# Patient Record
Sex: FEMALE | Race: BLACK OR AFRICAN AMERICAN
[De-identification: names, ages, dates, MRNs, and addresses within clinical notes are randomized per-mention and may not be internally consistent; named-entity substitution may affect disease eponyms.]

---

## 2017-02-12 ENCOUNTER — HOSPITAL ENCOUNTER (EMERGENCY)
Dept: HOSPITAL 10 - E/R | Age: 37
Discharge: HOME | End: 2017-02-12
Payer: COMMERCIAL

## 2017-02-12 VITALS — BODY MASS INDEX: 66.33 KG/M2 | HEIGHT: 55 IN | WEIGHT: 286.6 LBS

## 2017-02-12 DIAGNOSIS — R51: Primary | ICD-10-CM

## 2017-02-12 DIAGNOSIS — R40.2142: ICD-10-CM

## 2017-02-12 DIAGNOSIS — R40.2362: ICD-10-CM

## 2017-02-12 DIAGNOSIS — F44.9: ICD-10-CM

## 2017-02-12 DIAGNOSIS — F17.210: ICD-10-CM

## 2017-02-12 DIAGNOSIS — R40.2132: ICD-10-CM

## 2017-02-12 PROCEDURE — 96374 THER/PROPH/DIAG INJ IV PUSH: CPT

## 2017-02-12 NOTE — ERD
ER Documentation


Chief Complaint


Date/Time


DATE: 2/12/17 


TIME: 14:40


Chief Complaint


HEADACHE X 1 WEEK; CRANIAL SHUNT IN HEAD





HPI


This is a 36-year-old woman presenting with headache and "seizures".  She has 

chronic recurrent pains including recurrent headaches and has intermittent full 

body tremors and is requesting opioids and benzodiazepine medications to help 

with her symptoms.  She has a history of  shunt although multiple previous CT 

scans of the brain have been unremarkable.  She has had no fevers or chills, no 

vomiting or diarrhea, no loss of consciousness, no chest pain or shortness of 

breath.





ROS


All systems reviewed and are negative except as per history of present illness.





Medications


Home Meds


Active Scripts


Ibuprofen* (Ibuprofen*) 600 Mg Tablet, 600 MG PO Q8 for PAIN, #30 TAB


   Prov:MADELINE WOLFE MD         2/12/17


Alprazolam* (Xanax*) 0.5 Mg Tab, 0.5 MG PO TID, #20 TAB


   Prov:WINSTON LE DO         10/23/16


Benzocaine* (Orajel Maximum*) 1 Applic Gel, 1 APPLIC BUCCAL Q6, #1 TUB


   Prov:KHARI ARTEAGA. NP         9/16/16


Amoxicillin* (Amoxicillin*) 500 Mg Cap, 500 MG PO TID for 7 Days, CAP


   Prov:KHARI ARTEAGA. NP         9/16/16


Ibuprofen* (Motrin*) 600 Mg Tab, 600 MG PO Q6H Y for PAIN AND OR ELEVATED TEMP, 

#30 TAB


   Prov:EVE JEFFERSON MD         9/15/16


Naproxen* (Naproxen*) 500 Mg Tablet, 500 MG PO BID, #20 TAB


   Prov:YUKO RAMACHANDRAN DO         7/30/16


Tramadol Hcl* (Ultram*) 50 Mg Tablet, 50 MG PO Q6H Y for PAIN, #10 TAB


   Prov:FANNY TYLER MD         7/21/16


Reported Medications


Methocarbamol* (Robaxin*) 500 Mg Tab, 500 MG PO BID, TAB


   7/21/16


Levetiracetam* (Keppra*) 500 Mg Tablet, 500 MG PO BID, TAB


   7/21/16


Meclizine Hcl* (Meclizine Hcl*) 25 Mg Tablet, 25 MG PO Q8H Y for DIZZINESS, TAB


   7/21/16


Venlafaxine Hcl* (Effexor XR*) 75 Mg Tab.er.24, 75 MG PO QAM, TAB.SA


   7/21/16


Furosemide* (Furosemide*) 20 Mg Tablet, 20 MG PO DAILY, #60 TAB


   7/21/16


Ondansetron Hcl* (Zofran* ODT) 4 mg -ODT Tab.disper, 4 MG PO Q8 Y for NAUSEA 

AND OR VOMITING, TAB


   1/6/15


Topiramate* (Topiramate*) 200 Mg Tablet, 200 MG PO BID, TAB


   1/6/15


Gabapentin* (Neurontin*) 300 Mg Capsule, 300 MG PO HS, CAP


   1/6/15


Mirtazapine* (Mirtazapine*) 15 Mg Tablet, 15 MG PO HS, TAB


   1/6/15


Hydroxyzine Hcl* (Hydroxyzine Hcl*) 25 Mg Tablet, 25 MG PO Q8H Y for ITCHING, 

TAB


   1/6/15


Acetazolamide* (Acetazolamide* ER) 500 Mg Capsule.er, 500 MG PO TID, CAP


   1/6/15





Allergies


Allergies:  


Coded Allergies:  


     No Known Allergies (Verified  Allergy, Unknown, 9/5/16)





PMhx/Soc


Pseudotumor cerebri post ventriculoperitoneal shunt with chronic recurrent 

headaches and 5 previous normal CT scans of the brain over the last 2 years, 

seizures, pseudoseizures, chronic pain syndrome and opioid dependence, 

depression


History of Surgery:  Yes (CHOLYCYSTECTOMY 1998, LUMPECTOMY R BREAST 2014,  

SHUNT 01/15/16)


Anesthesia Reaction:  No


Hx Neurological Disorder:  Yes (HX CHRONIC HEADACHES,  Shunt)


Hx Respiratory Disorders:  No


Hx Cardiac Disorders:  No


Hx Psychiatric Problems:  No


Hx Miscellaneous Medical Probl:  Yes (ANEMIA,SICKLE CELL TRAIT)


Hx Alcohol Use:  No


Hx Substance Use:  Yes (MARIJUANA)


Hx Tobacco Use:  Yes (3 CIG/DAY X15 YEARS)


Smoking Status:  Current every day smoker





FmHx


Family History:  No diabetes





Physical Exam


Vitals





Vital Signs








  Date Time  Temp Pulse Resp B/P Pulse Ox O2 Delivery O2 Flow Rate FiO2


 


2/12/17 13:41 97.9 112 18 143/95 99   








Physical Exam


GENERAL: Well-developed, well-nourished, anxious


HEENT: Moist mucous membranes, pink conjunctiva, no cervical spine tenderness 

or step-off deformities, no goiter, no jaundice or icterus, extraocular 

movements intact without pain. No submandibular induration, and no pharyngeal 

erythema


NEURO: Alert and oriented 3, cranial nerves II through XII intact bilaterally, 

pupils equal round reactive to light, no focal deficits or facial asymmetry, 

sensation intact distally Strength 5/5 in upper and lower extremities 

bilaterally


CARDIAC: Regular rate and rhythm, no murmurs rubs or gallops


LUNGS: Clear bilaterally no wheezing crackles or stridor


ABDOMEN: Soft nontender, no guarding, no rigidity, no rebound, no psoas sign no 

obturator sign. Normoactive bowel sounds


SKIN: Warm and dry to touch, no abrasions, contusions, or hematomas, no 

lacerations, no ecchymosis, no target lesions, and without ulcers


EXTREMITIES: No clubbing cyanosis or edema, calves are bilaterally symmetrical, 

no Homans sign, no popliteal cord sign. Distal pulses equal and bilateral


PSYCH: Anxious


Results 24 hrs








 Current Medications








 Medications


  (Trade)  Dose


 Ordered  Sig/Glen


 Route


 PRN Reason  Start Time


 Stop Time Status Last Admin


Dose Admin


 


 Lorazepam


  (Ativan)  1 mg  ONCE  ONCE


 IV


   2/12/17 15:00


 2/12/17 15:01 DC 2/12/17 14:45


 














Procedures/MDM


IV line was established and I administer lorazepam 1 mg IV to help with her 

symptoms.  Patient stated she had analgesics at home and would rather use her 

opioid medications that were prescribed to her at home.  I refused to give her 

both benzodiazepines and opioids in the emergency department today.





I also reviewed her previous workups and imaging studies.





Differential diagnoses considered, included but not limited to acute coronary 

syndrome, pulmonary embolism, aortic dissection, abdominal aortic aneurysm, 

sepsis, stroke, meningitis, encephalitis, pneumonia, appendicitis, cholecystitis

, bowel obstruction, pyelonephritis, nephrolithiasis, cystitis, as well as 

metabolic, hematologic, and electrolyte abnormalities. As well as abscess, 

cellulitis, fractures, and dislocations.





Patient feels much better at this time, and vital signs are normal, symptoms 

have improved. I did give strict instructions to return to the ED if symptoms 

continue or worsen, patient will otherwise follow-up with primary care 

physician. Patient understood instructions and agreed to plan.





Departure


Diagnosis:  


 Primary Impression:  


 Headache


 Headache type:  unspecified  Headache chronicity pattern:  chronic headache  

Intractability:  not intractable  Qualified Code:  R51 - Chronic nonintractable 

headache, unspecified headache type


 Additional Impression:  


 Conversion disorder


Condition:  Good


Patient Instructions:  Self-Care for Headaches, Seizure, Recurrent [Adult]


Referrals:  


JEANETH ARROYO MD (PCP)











MADELINE WOLFE MD Feb 12, 2017 14:42

## 2017-04-16 ENCOUNTER — HOSPITAL ENCOUNTER (EMERGENCY)
Dept: HOSPITAL 10 - E/R | Age: 37
Discharge: HOME | End: 2017-04-16
Payer: SELF-PAY

## 2017-04-16 VITALS
BODY MASS INDEX: 47.09 KG/M2 | BODY MASS INDEX: 47.09 KG/M2 | HEIGHT: 66 IN | HEIGHT: 66 IN | WEIGHT: 293 LBS | WEIGHT: 293 LBS

## 2017-04-16 VITALS — DIASTOLIC BLOOD PRESSURE: 67 MMHG | HEART RATE: 84 BPM | RESPIRATION RATE: 16 BRPM | SYSTOLIC BLOOD PRESSURE: 132 MMHG

## 2017-04-16 DIAGNOSIS — R40.2142: ICD-10-CM

## 2017-04-16 DIAGNOSIS — G44.219: Primary | ICD-10-CM

## 2017-04-16 DIAGNOSIS — F17.210: ICD-10-CM

## 2017-04-16 DIAGNOSIS — R40.2362: ICD-10-CM

## 2017-04-16 DIAGNOSIS — R40.2252: ICD-10-CM

## 2017-04-16 PROCEDURE — 96375 TX/PRO/DX INJ NEW DRUG ADDON: CPT

## 2017-04-16 PROCEDURE — 96374 THER/PROPH/DIAG INJ IV PUSH: CPT

## 2017-04-16 PROCEDURE — 96376 TX/PRO/DX INJ SAME DRUG ADON: CPT

## 2017-04-16 PROCEDURE — 36415 COLL VENOUS BLD VENIPUNCTURE: CPT

## 2017-04-16 PROCEDURE — 70450 CT HEAD/BRAIN W/O DYE: CPT

## 2017-04-16 PROCEDURE — 82962 GLUCOSE BLOOD TEST: CPT

## 2017-04-16 NOTE — RADRPT
PROCEDURE:   CT Brain without. 

 

CLINICAL INDICATION:  Headache, weakness, history of seizure and  shunt.

 

TECHNIQUE:   A CT of the brain was performed on multidetector high-resolution CT scanner utilizing a
xial sections from the skull base through the vertex without contrast.  The scan was reviewed in sof
t tissue brain and high frequency resolution bone algorithm windows.  Images were reviewed on a high
-resolution PACS workstation. One or more the following does reduction techniques were utilized: Aut
omated exposure control, adjustment of the mA/ or kV according to patient's size, or use of iterativ
e reconstruction technique. The exam CTDI = 44.81 mGy and the DLP = 720.23 mGy-cm. 

 

COMPARISON:   Brain CT 10/23/2016.

 

FINDINGS:

The right frontal approach ventriculostomy catheter is again noted crossing the midline and terminat
es in the region of the body of the left lateral ventricle. The lateral and third ventricles are sli
t-like without significant interval change compared to prior CT.

 

There is no intracranial hemorrhage, mass effect or midline shift.  No abnormal intra-axial or extra
-axial fluid collections are seen. The gray/white matter differentiation is preserved. 

The visualized paranasal sinuses are essentially clear. The right parietal jabier hole is again noted.

 

IMPRESSION:

 

1.  Similar position of right frontal approach ventriculostomy catheter. Slit-like appearance of lat
eral and third ventricles without significant interval change compared to prior CT.

 

2.  No acute intracranial hemorrhage, transcortical infarction or mass effect.

 

 

 

RPTAT: HH

_____________________________________________ 

.Benjamin Mckee MD, MD           Date    Time 

Electronically viewed and signed by .Benjamin Mckee MD, MD on 04/16/2017 09:33 

 

D:  04/16/2017 09:33  T:  04/16/2017 09:33

.N/

## 2017-04-16 NOTE — ERD
ER Documentation


Chief Complaint


Date/Time


DATE: 4/16/17 


TIME: 08:46


Chief Complaint


HA X3 WEEK, SHAKINESS TODAY; HX  SHUNT REPLACEMENT 01/2016





HPI


This is a 36-year-old female with a known history of pseudotumor cerebri with a 

 shunt initially placed in 2014 and most recently replaced in January 2016 at 

[facility].  The patient indicates she is experiencing a bandlike 

pressure-like headache for the past 3 weeks.  She indicates that the headache 

has been persistent.  She states this is not the worst headache of her life.  

She has had no fevers or shaking or chills.  She denies any changes in vision.  

She has had no bilious or nonbilious emesis.  She does indicate just prior to 

arrival she developed tremors of her upper and lower extremities as well as her 

eyes.  She has a history of atypical seizures and takes Keppra.  She has had no 

shortness of breath at rest or exertion.  She denies any recent remote head 

trauma





ROS


All systems reviewed and are negative except as per history of present illness.





Medications


Home Meds


Active Scripts


Hydrocodone/Acetaminophen (Norco 5-325 Tablet) 1 Each Tablet, 1 TAB PO Q6H Y 

for PAIN, #20 TAB


   Prov:KARLO SALCIDO         4/16/17


Alprazolam* (Xanax*) 0.5 Mg Tab, 0.5 MG PO TID, #20 TAB


   Prov:WINSTON LE DO         10/23/16


Tramadol Hcl* (Ultram*) 50 Mg Tablet, 50 MG PO Q6H Y for PAIN, #10 TAB


   Prov:FANNY TYLER MD         7/21/16


Reported Medications


Levetiracetam* (Keppra*) 1,000 Mg Tablet, 1000 MG PO BID, TAB


   4/16/17


Methocarbamol* (Robaxin*) 500 Mg Tab, 500 MG PO BID, TAB


   7/21/16


Meclizine Hcl* (Meclizine Hcl*) 25 Mg Tablet, 25 MG PO Q8H Y for DIZZINESS, TAB


   7/21/16


Venlafaxine Hcl* (Effexor XR*) 75 Mg Tab.er.24, 75 MG PO QAM, TAB.SA


   7/21/16


Furosemide* (Furosemide*) 20 Mg Tablet, 20 MG PO DAILY, #60 TAB


   7/21/16


Ondansetron Hcl* (Zofran* ODT) 4 mg -ODT Tab.disper, 4 MG PO Q8 Y for NAUSEA 

AND OR VOMITING, TAB


   1/6/15


Topiramate* (Topiramate*) 200 Mg Tablet, 200 MG PO BID, TAB


   1/6/15


Gabapentin* (Neurontin*) 300 Mg Capsule, 300 MG PO HS, CAP


   1/6/15


Mirtazapine* (Mirtazapine*) 15 Mg Tablet, 15 MG PO HS, TAB


   1/6/15


Hydroxyzine Hcl* (Hydroxyzine Hcl*) 25 Mg Tablet, 25 MG PO Q8H Y for ITCHING, 

TAB


   1/6/15


Acetazolamide* (Acetazolamide* ER) 500 Mg Capsule.er, 500 MG PO TID, CAP


   1/6/15


Discontinued Reported Medications


Levetiracetam* (Keppra*) 500 Mg Tablet, 500 MG PO BID, TAB


   7/21/16


Discontinued Scripts


Ibuprofen* (Ibuprofen*) 600 Mg Tablet, 600 MG PO Q8 for PAIN, #30 TAB


   Prov:MADELINE WOLFE MD         2/12/17


Benzocaine* (Orajel Maximum*) 1 Applic Gel, 1 APPLIC BUCCAL Q6, #1 TUB


   Prov:KHARI ARTEAGA NP         9/16/16


Amoxicillin* (Amoxicillin*) 500 Mg Cap, 500 MG PO TID for 7 Days, CAP


   Prov:KHARI ARTEAGA NP         9/16/16


Ibuprofen* (Motrin*) 600 Mg Tab, 600 MG PO Q6H Y for PAIN AND OR ELEVATED TEMP, 

#30 TAB


   Prov:EVE JEFFERSON MD         9/15/16


Naproxen* (Naproxen*) 500 Mg Tablet, 500 MG PO BID, #20 TAB


   Prov:YUKO RAMACHANDRAN DO         7/30/16





Allergies


Allergies:  


Coded Allergies:  


     No Known Allergies (Verified  Allergy, Unknown, 4/16/17)





PMhx/Soc


History of Surgery:  Yes (CHOLYCYSTECTOMY 1998, LUMPECTOMY R BREAST 2014,  

SHUNT 01/15/16)


Anesthesia Reaction:  No


Hx Neurological Disorder:  Yes (HX CHRONIC HEADACHES,  Shunt)


Hx Respiratory Disorders:  No


Hx Cardiac Disorders:  No


Hx Psychiatric Problems:  No


Hx Miscellaneous Medical Probl:  Yes (ANEMIA,SICKLE CELL TRAIT)


Hx Alcohol Use:  No


Hx Substance Use:  Yes (MARIJUANA)


Hx Tobacco Use:  Yes (3 CIG/DAY X15 YEARS)





Physical Exam


Vitals





Vital Signs








  Date Time  Temp Pulse Resp B/P Pulse Ox O2 Delivery O2 Flow Rate FiO2


 


4/16/17 12:16  78 16 124/77 98 Non Rebreather  


 


4/16/17 10:42  88 18 132/79 100 Room Air  


 


4/16/17 08:36 98.2 97 20 135/98 100   








Physical Exam


Constitutional:Well-developed. Well-nourished.


HEENT:Normocephalic. Atraumatic.Pupils were equal round reactive to light. 

Moist mucous membranes.No tonsillar exudates.  Endoscopy exam showed sharp 

optic disc bilaterally and venous pulsations were present.  No nasoseptal 

hematoma.  No hemotympanum.  No tenderness over the  shunt of the right 

temporal region.


Neck: No nuchal rigidity. No lymphadenopathy. No posterior cervical spine 

tenderness or step-offs.


Respiratory: Not using accessory muscles of respiration.Lungs were clear to 

auscultation bilaterally. No rhonchi. No rales. No wheezing. 


Cardiovascular: Regular rate regular rhythm.No murmurs. No rubs were 

appreciated.S1, S2 normal. Distal pulses are palpable 2+ bilaterally.


GI: Abdomen was soft. Nontender. Non Distended. No pulsatile abdominal masses 

or bruits. No rebound. No guarding. Bowel sounds were present and normal. 


Muscle skeletal: Full range of motion of both the upper and lower extremities 

bilaterally.Normal muscle tone.No assymetrical calf tenderness or swelling. 


Skin: No petechia, no purpura. No lesions on the palms or the soles of the 

feet. No maculopapular rash.


NEURO: Patient was alert, awake, orientated x3.No facial droop. Gait observed 

and normal with no ataxia.Speech had regular rate and rhythm. No focal 

neurological deficits.


Results 24 hrs





 Laboratory Tests








Test


  4/16/17


11:42


 


Bedside Glucose 94mg/dL 








 Current Medications








 Medications


  (Trade)  Dose


 Ordered  Sig/Glen


 Route


 PRN Reason  Start Time


 Stop Time Status Last Admin


Dose Admin


 


 Sodium Chloride


  (NS)  1,000 ml @ 


 1,000 mls/hr  Q1H STAT


 IV


   4/16/17 08:40


 4/16/17 09:39 DC 4/16/17 09:06


 


 


 Ondansetron HCl


  (Zofran Inj)  4 mg  ONCE  STAT


 IV


   4/16/17 08:40


 4/16/17 08:41 DC 4/16/17 09:06


 


 


 Morphine Sulfate


  (morphine)  4 mg  ONCE  STAT


 IV


   4/16/17 08:40


 4/16/17 08:41 DC 4/16/17 09:06


 


 


 Lorazepam


  (Ativan)  1 mg  ONCE  ONCE


 IV


   4/16/17 09:00


 4/16/17 09:01 DC 4/16/17 09:07


 


 


 Lorazepam


  (Ativan)  1 mg  ONCE  ONCE


 IV


   4/16/17 11:00


 4/16/17 11:01 DC 4/16/17 10:41


 


 


 Morphine Sulfate


  (morphine)  4 mg  ONCE  STAT


 IV


   4/16/17 11:16


 4/16/17 11:17 DC 4/16/17 11:24


 


 


 Ondansetron HCl


  (Zofran Inj)  4 mg  ONCE  STAT


 IV


   4/16/17 11:16


 4/16/17 11:17 DC 4/16/17 11:24


 


 


 Diclofenac Sodium


  (Dyloject)  37.5 mg  ONCE  STAT


 IV


   4/16/17 12:33


 4/16/17 12:34 DC 4/16/17 12:39


 











Procedures/MDM


The patient presented to the emergency department with a subacute headache that 

began within weeks to months of onset. My differential diagnosis included but 

was not limited to chronic subdural hematoma, brain tumor, brain abscess, 

chronic sinusitis, temporal arteritis, temporomandibular joint syndrome, 

psuedotumor cerebri, glaucoma, migrane, HTN, intracranial hemorrhage or 

cerebral ischemia. 





I obtained a CT scan of the patient's head and there was no evidence of 

intracerebral hemorrhage mass-effect or midline shift.  The patient indicates 

that she has not been able to take any analgesic medication other than over-the-

counter ibuprofen as she had complications with her insurance and normally 

would take Norco when her pain will become less intense.





This was not the patients worse headache of their life. The patient had a 

complete neurologic and fundoscopic exam performed by myself that was normal 

with  no focal neurological deficits or retinal hemorrhage.  The patient stated 

this headache was not severe or distinct from other headaches and the history 

with the physical exam findings did not likely suggest SAH. Therefore, I did 

not feel it was clinically necessary to perform a lumbar puncture and CSF 

analysis. 





Please note that the patient has very poor peripheral vascular access.  Nursing 

staff as well as the phlebotomist attempted to draw blood work for ancillary 

laboratory work.  The patient did consent to a femoral stick in order for me to 

obtain blood work.  The right femoral vein was cannulated using an 18-gauge 

needle by myself under sterile conditions however the blood obtained had 

clotted.  The patient had an Accu-Chek performed and was normal at 79.  The 

patient was refusing any further ancillary laboratory work attempts.  She has 

no history of renal failure and therefore I did not feel the patient's symptoms 

were result of an electrolyte abnormality.





Observation Note:


Time:   4 hours


Family Hx:   No Hypertension


Evaluation:   Multiple exams showed improving symptoms and no evidence of 

complications of her  shunt.  However during her observation.  The patient 

had 2 seizure-like activities.  The patient appeared in my clinical impression 

to be experiencing pseudoseizures as she had tremors of her upper extremities 

and shaking of her head however she was speaking during these activity 

requesting Ativan.  IV Ativan 1 mg had been given for the 2 seizure-like 

activities were the patient did not lose urinary incontinence and did not bite 

her tongue which completely resolved afterwards


The patient had IV access established by nursing staff and was given IV Zofran 

and morphine for analgesic control.  The pain is completely resolved and she 

felt comfortable being discharged home and she was requesting a prescription 

for Norco.  The patient was discharged home in fair condition. They were 

instructed to return to the emergency department at any time if there was any 

worsening of their condition. The patient stated they would follow up with 

their PCP in the next 24-48 hours to initiate a suitable medication regimen 

under the care of their PCP as well as to allow their PCP to monitor any drug 

reactions. The patient was discharged home with prescriptions after they gave 

informed consent to the new medication.  They were also fully informed by 

myself on the adverse effects and adverse drug interactions in order to provide 

adequate safeguards to prevent possible adverse reactions to medications.





Departure


Diagnosis:  


 Primary Impression:  


 Headache


 Headache type:  tension-type  Headache chronicity pattern:  episodic headache  

Intractability:  not intractable  Qualified Code:  G44.219 - Episodic tension-

type headache, not intractable


 Additional Impression:  


 Seizure-like activity


Condition:  Fair











KARLO SALCIDO Apr 16, 2017 08:49

## 2017-05-21 ENCOUNTER — HOSPITAL ENCOUNTER (EMERGENCY)
Dept: HOSPITAL 10 - E/R | Age: 37
Discharge: HOME | End: 2017-05-21
Payer: MEDICAID

## 2017-05-21 VITALS
BODY MASS INDEX: 38.06 KG/M2 | WEIGHT: 242.51 LBS | HEIGHT: 67 IN | BODY MASS INDEX: 38.06 KG/M2 | HEIGHT: 67 IN | WEIGHT: 242.51 LBS

## 2017-05-21 VITALS — HEART RATE: 89 BPM | RESPIRATION RATE: 20 BRPM | SYSTOLIC BLOOD PRESSURE: 139 MMHG | DIASTOLIC BLOOD PRESSURE: 78 MMHG

## 2017-05-21 DIAGNOSIS — G40.909: Primary | ICD-10-CM

## 2017-05-21 PROCEDURE — 96374 THER/PROPH/DIAG INJ IV PUSH: CPT

## 2017-05-21 PROCEDURE — 96372 THER/PROPH/DIAG INJ SC/IM: CPT

## 2017-05-21 PROCEDURE — 70450 CT HEAD/BRAIN W/O DYE: CPT

## 2017-05-21 PROCEDURE — 96376 TX/PRO/DX INJ SAME DRUG ADON: CPT

## 2017-05-21 RX ADMIN — LORAZEPAM ONE MG: 2 INJECTION, SOLUTION INTRAMUSCULAR; INTRAVENOUS at 09:39

## 2017-05-21 RX ADMIN — LORAZEPAM ONE MG: 2 INJECTION, SOLUTION INTRAMUSCULAR; INTRAVENOUS at 11:22

## 2017-05-21 NOTE — ERD
ER Documentation


Chief Complaint


Date/Time


DATE: 5/21/17 


TIME: 10:45


Chief Complaint


EPISODES OF SEIZURE 15MINS PTA





HPI


This is a 37-year-old female with a history of pseudotumor cerebri with  

shunt.  Patient is presenting with a seizure prior to arrival.  The patient's 

boyfriend dropped her off in the left.  The patient was wheeled in from the 

ambulance bay in a wheelchair actively having a seizure.  Patient seizure 

activity markedly resembled a pseudoseizure.  The patient was able to stand up 

out of the car and transferring to a wheelchair on her own.  She would answer 

periodic questions during her initial assessment.  She would stop shaking and 

make comments to the staff and start shaking again.  The patient does look very 

familiar to me that they can see her in the past with diagnosis of 

pseudoseizure.





ROS


All systems reviewed and are negative except as per history of present illness.





Medications


Home Meds


Active Scripts


Lorazepam* (Lorazepam*) 1 Mg Tablet, 1 MG PO Q8H Y for SEIZURE OR ANXIETY, #15 

TAB


   Prov:DARRYL RAMIREZ DO         5/21/17





Allergies


Allergies:  


Coded Allergies:  


     No Known Allergy (Unverified , 5/21/17)





FmHx


Family History:  No coronary disease





Physical Exam


Vitals





Vital Signs








  Date Time  Temp Pulse Resp B/P Pulse Ox O2 Delivery O2 Flow Rate FiO2


 


5/21/17 09:28 98.1 90 20 102/67 99   








Physical Exam


Const:      Well-developed, well-nourished


 Head:        Atraumatic, normocephalic


 Eyes:       Normal Conjunctiva, PERRLA, EOMI, normal sclera, no nystagmus


 ENT:         Normal External Ears, Nose and Mouth, moist mucus membranes.


 Neck:        Full range of motion.  No meningismus, no lymphadenopathy.


 Resp:         Clear to auscultation bilaterally, no wheezing, rhonchi, rales


 Cardio:       Regular rate and rhythm, no murmurs, S1 S2 present


 Abd:         Soft, non tender x 4, non distended. Normal bowel sounds, no 

guarding or rebound, no pulsitile abdominal masses or bruits


 Skin:         No petechiae or rashes, no ecchymosis , no maculopapular rash


 Back:        No midline or flank tenderness


 Ext:          No cyanosis, or edema, FROM x 4, normal inspection, 

neurovascularly intact x 4


 Neur:        Patient acting as described above in the history.  She is having 

some left eye upward gaze however when you roll her head side to side her eyes 

will stop looking up and left and look straight ahead into the right and she 

will spontaneously move them back to the left upper quadrant.,  Holding her 

right hand above her face and if dropped it should hit her in the face however 

it falls above her head each time .,she is moving all fours


 Psych:        Normal Mood and Affect


Results 24 hrs





 Current Medications








 Medications


  (Trade)  Dose


 Ordered  Sig/Victorino


 Route


 PRN Reason  Start Time


 Stop Time Status Last Admin


Dose Admin


 


 Lorazepam


  (Ativan)  2 mg  ONCE  ONCE


 IM


   5/21/17 09:30


 5/21/17 09:31 DC  


 


 


 Ondansetron HCl


  (Zofran Inj)  4 mg  ONCE  STAT


 IV


   5/21/17 09:55


 5/21/17 09:56 DC 5/21/17 10:07


 


 


 Ondansetron HCl


  (Zofran Odt)  4 mg  ONCE  STAT


 ODT


   5/21/17 10:12


 5/21/17 10:13 DC  


 











Procedures/MDM


PROCEDURE:   CT Brain without contrast. 


 


CLINICAL INDICATION:  Seizure.  Altered mental status


 


TECHNIQUE:   A multiplanar CT of the brain was performed on a CT scanner 

utilizing axial imaging from the skull base through the vertex without IV 

contrast.  The CTDIvol is 43.38 mGy and the DLP is 740.23 mGycm.  One or more 

of the following dose reduction techniques were utilized:  Automated exposure 

control, adjustment of the mA and/or kV according to patient size, use of 

iterative reconstruction technique.


 


COMPARISON:   None 


 


FINDINGS:


 


Right frontal ventriculostomy catheter in place traversing the frontal horn of 

the right lateral ventricle with the distal tip at the level of the foramen of 

Monro on the left. The ventricles are normal in size. Right parietal calvarial 

ximena hole.


 


 


The brain parenchyma is normal attenuation morphology with preservation of gray 

white differentiation and age appropriate size of the ventricles and 

subarachnoid spaces.


 


 


 


The basal cisterns, posterior fossa contents, brainstem, craniocervical junction

, orbits, pituitary axis, paranasal sinuses, mastoid air cells, and calvarium 

are unremarkable.


 


 


 


IMPRESSION:


 


1.  No intracranial hemorrhage or acute intracranial abnormality.


2.  Right frontal ventriculostomy catheter in place with distal tip at the 

level of the foramen of Monro .


3.  No hydrocephalus or gross structural abnormality.


 


 


 


 


RPTAT:AAJJ


_____________________________________________ 


MARLEY Joshi Physician           Date    Time 


Electronically viewed and signed by MARLEY Joshi Physician on 05/21/2017 09:58 


 


D:  05/21/2017 09:58  T:  05/21/2017 09:58


CELY/





CC: DARRYL RAMIREZ DO




















Patient was given Ativan and seizure to stop.





CT scan does not show any signs of hydrocephalus and her shunt looks intact.





Discussed the patient follow-up in advised her to go to [facility] or other Medical 

Center as her doctor is not returning her calls and will not see her anymore.





i truly feel this is a pseudoseizure





Departure


Diagnosis:  


 Primary Impression:  


 Seizure disorder


 Additional Impression:  


 Pseudoseizure


Condition:  Stable


Patient Instructions:  Seizure, Recurrent [Adult]


Referrals:  


NO PRIMARY,CARE PHYSICIAN (PCP)











DARRYL RAMIREZ DO May 21, 2017 10:48

## 2017-05-21 NOTE — RADRPT
PROCEDURE:   CT Brain without contrast. 

 

CLINICAL INDICATION:  Seizure.  Altered mental status

 

TECHNIQUE:   A multiplanar CT of the brain was performed on a CT scanner utilizing axial imaging fro
m the skull base through the vertex without IV contrast.  The CTDIvol is 43.38 mGy and the DLP is 74
0.23 mGycm.  One or more of the following dose reduction techniques were utilized:  Automated exposu
re control, adjustment of the mA and/or kV according to patient size, use of iterative reconstructio
n technique.

 

COMPARISON:   None 

 

FINDINGS:

 

Right frontal ventriculostomy catheter in place traversing the frontal horn of the right lateral janine
tricle with the distal tip at the level of the foramen of Monro on the left. The ventricles are norm
al in size. Right parietal calvarial ximena hole.

 

 

The brain parenchyma is normal attenuation morphology with preservation of gray white differentiatio
n and age appropriate size of the ventricles and subarachnoid spaces.

 

 

 

The basal cisterns, posterior fossa contents, brainstem, craniocervical junction, orbits, pituitary 
axis, paranasal sinuses, mastoid air cells, and calvarium are unremarkable.

 

 

 

IMPRESSION:

 

1.  No intracranial hemorrhage or acute intracranial abnormality.

2.  Right frontal ventriculostomy catheter in place with distal tip at the level of the foramen of M
onro .

3.  No hydrocephalus or gross structural abnormality.

 

 

 

 

RPTAT:AAJJ

_____________________________________________ 

Physician Sb           Date    Time 

Electronically viewed and signed by Physician Sb on 05/21/2017 09:58 

 

D:  05/21/2017 09:58  T:  05/21/2017 09:58

CELY/

## 2017-06-26 ENCOUNTER — HOSPITAL ENCOUNTER (EMERGENCY)
Dept: HOSPITAL 10 - E/R | Age: 37
Discharge: HOME | End: 2017-06-26
Payer: MEDICAID

## 2017-06-26 VITALS
TEMPERATURE: 97.5 F | HEART RATE: 75 BPM | DIASTOLIC BLOOD PRESSURE: 73 MMHG | RESPIRATION RATE: 16 BRPM | SYSTOLIC BLOOD PRESSURE: 123 MMHG

## 2017-06-26 VITALS
HEIGHT: 66 IN | WEIGHT: 293 LBS | WEIGHT: 293 LBS | BODY MASS INDEX: 47.09 KG/M2 | HEIGHT: 66 IN | BODY MASS INDEX: 47.09 KG/M2

## 2017-06-26 DIAGNOSIS — R40.2252: ICD-10-CM

## 2017-06-26 DIAGNOSIS — G44.209: Primary | ICD-10-CM

## 2017-06-26 PROCEDURE — 96372 THER/PROPH/DIAG INJ SC/IM: CPT

## 2017-06-26 NOTE — ERD
ER Documentation


Chief Complaint


Date/Time


DATE: 6/26/17 


TIME: 23:00


Chief Complaint


Complains of Nausea,vomiting and abdominal pain





HPI


37-year-old woman with a long history of recurrent headaches and 

ventriculoperitoneal shunts presents with headache requesting opioid analgesics 

because she ran out of her prescription and states she has an appointment with 

her pain management specialist in 3 days.  She denies blurry vision, no slurred 

speech, no chest pain or shortness of breath.  Patient does complain of nausea 

but denies vomiting.





ROS


All systems reviewed and are negative except as per history of present illness.





Medications


Home Meds


Active Scripts


Ondansetron Hcl* (Zofran*) 4 Mg Tablet, 4 MG PO Q6H for NAUSEA AND/OR VOMITING, 

#15 TAB


   Prov:ANYA WALSH MD         6/26/17


Oxycodone HCl/Acetaminophen (Percocet 5-325 mg Tablet) 1 Each Tablet, 1 EACH PO 

TID for PAIN, #12 TAB


   Prov:ANYA WALSH MD         6/26/17


Oxycodone HCl/Acetaminophen (Percocet 5-325 mg Tablet) 1 Each Tablet, 1 EACH PO 

TID for PAIN, #12 TAB


   Prov:ANYA WALSH MD         6/26/17


Lorazepam* (Lorazepam*) 1 Mg Tablet, 1 MG PO Q8H Y for SEIZURE OR ANXIETY, #15 

TAB


   Prov:DARRYL RAMIREZ DO         5/21/17


Reported Medications


Levetiracetam* (Levetiracetam*) 500 Mg Tablet, 500 MG PO BID, TAB


   6/26/17


Topiramate* (Topamax*) 25 Mg Cap.sprink, 25 MG PO BID, CAP


   6/26/17


Discontinued Reported Medications


Topiramate* (Topamax*) 25 Mg Cap.sprink, 50 MG PO BID, CAP


   5/21/17


Levetiracetam* (Keppra*) 1,000 Mg Tablet, 2000 MG PO BID, TAB


   5/21/17





Allergies


Allergies:  


Coded Allergies:  


     No Known Allergy (Unverified , 6/26/17)





PMhx/Soc


Chronic recurrent headaches,  shunt


History of Surgery:  Yes ( shunt)


Hx Neurological Disorder:  Yes (sz)


Hx Miscellaneous Medical Probl:  Yes (arthritis R hip)


Hx Alcohol Use:  No


Hx Substance Use:  No


Hx Tobacco Use:  No


Smoking Status:  Never smoker





FmHx


Family History:  No diabetes





Physical Exam


Vitals





Vital Signs








  Date Time  Temp Pulse Resp B/P Pulse Ox O2 Delivery O2 Flow Rate FiO2


 


6/26/17 16:25 97.5 75 16 123/73 100 Room Air  


 


6/26/17 13:07 98.3 99 20 134/73 96   








Physical Exam


GENERAL: Well-developed, well-nourished, well-hydrated, in no apparent distress

, looks nontoxic in appearance


HEENT: Moist mucous membranes, pink conjunctiva, no cervical spine tenderness 

or step-off deformities, no goiter, no jaundice or icterus, extraocular 

movements intact without pain. No submandibular induration, and no pharyngeal 

erythema


NEURO: Alert and oriented 3, cranial nerves II through XII intact bilaterally, 

pupils equal round reactive to light, no focal deficits or facial asymmetry, 

sensation intact distally Strength 5/5 in upper and lower extremities 

bilaterally


CARDIAC: Regular rate and rhythm, no murmurs rubs or gallops


LUNGS: Clear bilaterally no wheezing crackles or stridor


ABDOMEN: Soft nontender, no guarding, no rigidity, no rebound, no psoas sign no 

obturator sign. Normoactive bowel sounds


SKIN: Warm and dry to touch, no abrasions, contusions, or hematomas, no 

lacerations, no ecchymosis, no target lesions, and without ulcers


EXTREMITIES: No clubbing cyanosis or edema, calves are bilaterally symmetrical, 

no Homans sign, no popliteal cord sign. Distal pulses equal and bilateral


PSYCH: Normal affect without agitation or irritability


Results 24 hrs





 Current Medications








 Medications


  (Trade)  Dose


 Ordered  Sig/Victorino


 Route


 PRN Reason  Start Time


 Stop Time Status Last Admin


Dose Admin


 


 Hydromorphone HCl


  (Dilaudid)  1 mg  ONCE  STAT


 IM


   6/26/17 15:59


 6/26/17 16:01 DC 6/26/17 16:10


 


 


 Ondansetron HCl


  (Zofran Odt)  4 mg  ONCE  STAT


 ODT


   6/26/17 15:59


 6/26/17 16:01 DC 6/26/17 16:08


 











Procedures/MDM


I administered hydromorphone 1 mg intramuscular injection and Zofran 4 mg ODT 

sublingual for her symptoms with improvement.





I reviewed the brain CT which was performed last month and it was unremarkable.

  Further imaging deferred to her PMD.  Patient's symptoms resolved completely 

and she feels much better.





Patient feels much better at this time, and vital signs are normal, symptoms 

have improved. I did give strict instructions to return to the ED if symptoms 

continue or worsen, patient will otherwise follow-up with primary care 

physician. Patient understood instructions and agreed to plan.





Disclaimer: Inadvertent spelling and grammatical errors are likely due to EHR/

dictation software use and do not reflect on the overall quality of patient 

care. Also, please note that the electronic time recorded on this note does not 

necessarily reflect the actual time of the patient encounter.





Departure


Diagnosis:  


 Primary Impression:  


 Headache


 Headache type:  tension-type  Headache chronicity pattern:  acute headache  

Intractability:  not intractable  Qualified Code:  G44.209 - Acute non 

intractable tension-type headache


Condition:  Good


Patient Instructions:  Headache, Tension


Referrals:  


MATILDA MCGRATH MD (PCP)











NAYA WALSH MD Jun 26, 2017 23:03

## 2017-07-10 ENCOUNTER — HOSPITAL ENCOUNTER (EMERGENCY)
Dept: HOSPITAL 10 - FTE | Age: 37
Discharge: LEFT BEFORE DISCHARGE | End: 2017-07-10
Payer: SELF-PAY

## 2017-07-10 VITALS
BODY MASS INDEX: 45.99 KG/M2 | BODY MASS INDEX: 45.99 KG/M2 | WEIGHT: 293 LBS | HEIGHT: 67 IN | HEIGHT: 67 IN | WEIGHT: 293 LBS

## 2017-07-10 DIAGNOSIS — Z53.21: Primary | ICD-10-CM

## 2017-10-02 ENCOUNTER — HOSPITAL ENCOUNTER (EMERGENCY)
Dept: HOSPITAL 10 - E/R | Age: 37
Discharge: LEFT BEFORE DISCHARGE | End: 2017-10-02
Payer: COMMERCIAL

## 2017-10-02 VITALS
BODY MASS INDEX: 42.45 KG/M2 | HEIGHT: 69 IN | WEIGHT: 286.6 LBS | BODY MASS INDEX: 42.45 KG/M2 | WEIGHT: 286.6 LBS | HEIGHT: 69 IN

## 2017-10-02 DIAGNOSIS — F17.210: ICD-10-CM

## 2017-10-02 DIAGNOSIS — R51: Primary | ICD-10-CM

## 2017-10-02 DIAGNOSIS — R00.1: ICD-10-CM

## 2017-10-02 PROCEDURE — 70450 CT HEAD/BRAIN W/O DYE: CPT

## 2017-10-02 PROCEDURE — 93005 ELECTROCARDIOGRAM TRACING: CPT

## 2017-10-02 NOTE — ERD
ER Documentation


Chief Complaint


Date/Time


DATE: 10/2/17 


TIME: 12:27


Chief Complaint


shaking head, obey command not talking, ambulatory, has icp idiopatic





HPI


Patient is a 37-year-old female who presents with trip and fall.  She has had a 

headache since the fall 2 days ago.  She had dizziness as well.  She has a 

history of a  shunt.  The patient has had no treatment yet for pain.  She 

also says that she has "eyes that are killing me".  Upon review of old medical 

records the patient has multiple visits for similar type complaints.





ROS


All systems reviewed and are negative except as per history of present illness.





Medications


Home Meds


Active Scripts


Hydrocodone/Acetaminophen (Norco 5-325 Tablet) 1 Each Tablet, 1 TAB PO Q6H Y 

for PAIN, #20 TAB


   Prov:KARLO SALCIDO         4/16/17


Alprazolam* (Xanax*) 0.5 Mg Tab, 0.5 MG PO TID, #20 TAB


   Prov:WINSTON LE DO         10/23/16


Tramadol Hcl* (Ultram*) 50 Mg Tablet, 50 MG PO Q6H Y for PAIN, #10 TAB


   Prov:FANNY TYLER MD         7/21/16


Reported Medications


Levetiracetam* (Keppra*) 1,000 Mg Tablet, 1000 MG PO BID, TAB


   4/16/17


Methocarbamol* (Robaxin*) 500 Mg Tab, 500 MG PO BID, TAB


   7/21/16


Meclizine Hcl* (Meclizine Hcl*) 25 Mg Tablet, 25 MG PO Q8H Y for DIZZINESS, TAB


   7/21/16


Venlafaxine Hcl* (Effexor XR*) 75 Mg Tab.er.24, 75 MG PO QAM, TAB.SA


   7/21/16


Furosemide* (Furosemide*) 20 Mg Tablet, 20 MG PO DAILY, #60 TAB


   7/21/16


Ondansetron Hcl* (Zofran* ODT) 4 mg -ODT Tab.disper, 4 MG PO Q8 Y for NAUSEA 

AND OR VOMITING, TAB


   1/6/15


Topiramate* (Topiramate*) 200 Mg Tablet, 200 MG PO BID, TAB


   1/6/15


Gabapentin* (Neurontin*) 300 Mg Capsule, 300 MG PO HS, CAP


   1/6/15


Mirtazapine* (Mirtazapine*) 15 Mg Tablet, 15 MG PO HS, TAB


   1/6/15


Hydroxyzine Hcl* (Hydroxyzine Hcl*) 25 Mg Tablet, 25 MG PO Q8H Y for ITCHING, 

TAB


   1/6/15


Acetazolamide* (Acetazolamide* ER) 500 Mg Capsule.er, 500 MG PO TID, CAP


   1/6/15





Allergies


Allergies:  


Coded Allergies:  


     No Known Allergies (Verified  Allergy, Unknown, 4/16/17)





PMhx/Soc


History of Surgery:  Yes (CHOLYCYSTECTOMY 1998, LUMPECTOMY R BREAST 2014,  

SHUNT 01/15/16)


Anesthesia Reaction:  No


Hx Neurological Disorder:  Yes (HX CHRONIC HEADACHES,  Shunt)


Hx Respiratory Disorders:  No


Hx Cardiac Disorders:  No


Hx Psychiatric Problems:  No


Hx Miscellaneous Medical Probl:  Yes (ANEMIA,SICKLE CELL TRAIT)


Hx Alcohol Use:  No


Hx Substance Use:  Yes (MARIJUANA)


Hx Tobacco Use:  Yes (3 CIG/DAY X15 YEARS)





FmHx


Family History:  No diabetes





Physical Exam


Vitals





Vital Signs








  Date Time  Temp Pulse Resp B/P Pulse Ox O2 Delivery O2 Flow Rate FiO2


 


10/2/17 10:25 98.1 98 18 135/82 99   








Physical Exam


Const: No acute distress


Head:   Atraumatic 


Eyes:    Normal Conjunctiva


ENT:    Normal External Ears, Nose and Mouth.


Neck:               Full range of motion..~ No meningismus.


Resp:    Clear to auscultation bilaterally


Cardio:    Regular rate and rhythm, no murmurs


Abd:    Soft, non tender, non distended. Normal bowel sounds


Skin:    No petechiae or rashes


Back:    No midline or flank tenderness


Ext:    No cyanosis, or edema


Neur:    Awake and alert, flickering of the eyes bilaterally, able to follow 

commands, no seizure activity





Procedures/MDM


EKG read by me: 


Rate/Rhythm: Regular rate and rhythm at a rate of 73


Intervals:    Normal


Impression:     No evidence of ischemia or arrhythmia





Patient is a 37-year-old female with  shunt who presents with a fall and 

headache.  CT scan shows no sign of skull fracture or intracranial hemorrhage 

or hydrocephalus.  I believe outpatient management is appropriate.  EKG shows 

no signs of ischemia.  She can return for any worsening symptoms.





Departure


Diagnosis:  


 Primary Impression:  


 Fall


 Encounter type:  initial encounter  Qualified Code:  W19.XXXA - Fall, initial 

encounter


 Additional Impression:  


 Headache


 Headache type:  unspecified  Headache chronicity pattern:  acute headache  

Intractability:  not intractable  Qualified Code:  R51 - Acute nonintractable 

headache, unspecified headache type


Condition:  Fair


Patient Instructions:  Self-Care for Headaches





Additional Instructions:  


Call your primary care doctor TOMORROW for an appointment during the next 1-2 

days.See the doctor sooner or return here if your condition worsens before your 

appointment time.











EVE JEFFERSON MD Oct 2, 2017 12:29

## 2017-10-02 NOTE — RADRPT
PROCEDURE:  CT brain without contrast

 

CLINICAL INDICATION:  Headaches, recent fall, history  shunt 

 

TECHNIQUE:  CT of the brain without contrast was performed on a multidetector CT scanner, with multi
planar reformats.  One or more of the following dose reduction techniques were used: Automated expos
ure control, adjustment in mA and / or kV according to patient size, use of iterative reconstructive
 technique. CTDIvol = 45 mGy; DLP = 630 mGy-cm. 

 

COMPARISON:   None available 

 

FINDINGS:

Right trans frontal ventricular shunt is unchanged in position with the tip in the region of the bod
y of the left lateral ventricle. The ventricles - sulci are unchanged in size and configuration with
out hydrocephalus identified. Lateral - third ventricles remain small, slit-like in appearance.

 

No acute intracranial hemorrhage is identified. No extra-axial fluid collection is seen.  No mass ef
fect or midline shift is identified.

 

The density of the brain is unremarkable.  Gray-white differentiation is preserved.  

 

The rest of the calvarium, and skull base are intact.  Mastoid air cells and imaged paranasal sinuse
s grossly clear.   

 

 

IMPRESSION:

1.  No evidence of acute intracranial pathology. No significant interval change.

2.  Right trans frontal ventricular shunt in place, with small slit like third - lateral ventricles.

 

 

RPTAT: VV

_____________________________________________ 

.Sergey Abernathy MD, MD           Date    Time 

Electronically viewed and signed by .Sergey Abernathy MD, MD on 10/02/2017 11:19 

 

D:  10/02/2017 11:19  T:  10/02/2017 11:19

.O/

## 2017-10-29 ENCOUNTER — HOSPITAL ENCOUNTER (EMERGENCY)
Dept: HOSPITAL 10 - E/R | Age: 37
Discharge: HOME | End: 2017-10-29
Payer: COMMERCIAL

## 2017-10-29 VITALS
HEIGHT: 67 IN | BODY MASS INDEX: 36.18 KG/M2 | WEIGHT: 230.49 LBS | HEIGHT: 67 IN | WEIGHT: 230.49 LBS | BODY MASS INDEX: 36.18 KG/M2

## 2017-10-29 VITALS
DIASTOLIC BLOOD PRESSURE: 77 MMHG | TEMPERATURE: 98.3 F | HEART RATE: 89 BPM | SYSTOLIC BLOOD PRESSURE: 138 MMHG | RESPIRATION RATE: 20 BRPM

## 2017-10-29 DIAGNOSIS — R40.2142: ICD-10-CM

## 2017-10-29 DIAGNOSIS — R51: Primary | ICD-10-CM

## 2017-10-29 DIAGNOSIS — E87.2: ICD-10-CM

## 2017-10-29 DIAGNOSIS — R40.2252: ICD-10-CM

## 2017-10-29 DIAGNOSIS — I10: ICD-10-CM

## 2017-10-29 DIAGNOSIS — Z98.2: ICD-10-CM

## 2017-10-29 DIAGNOSIS — R40.2362: ICD-10-CM

## 2017-10-29 LAB
ANION GAP SERPL CALC-SCNC: 15 MMOL/L (ref 8–16)
BASOPHILS # BLD AUTO: 0 10^3/UL (ref 0–0.1)
BASOPHILS NFR BLD: 0.4 % (ref 0–2)
BUN SERPL-MCNC: 14 MG/DL (ref 7–20)
CALCIUM SERPL-MCNC: 8.9 MG/DL (ref 8.4–10.2)
CHLORIDE SERPL-SCNC: 119 MMOL/L (ref 97–110)
CO2 SERPL-SCNC: 17 MMOL/L (ref 21–31)
CREAT SERPL-MCNC: 0.87 MG/DL (ref 0.44–1)
EOSINOPHIL # BLD: 0.1 10^3/UL (ref 0–0.5)
EOSINOPHIL NFR BLD: 1.1 % (ref 0–7)
ERYTHROCYTE [DISTWIDTH] IN BLOOD BY AUTOMATED COUNT: 15.8 % (ref 11.5–14.5)
GLUCOSE SERPL-MCNC: 83 MG/DL (ref 70–220)
HCT VFR BLD CALC: 42.7 % (ref 37–47)
HGB BLD-MCNC: 12.9 G/DL (ref 12–16)
LYMPHOCYTES # BLD AUTO: 4.1 10^3/UL (ref 0.8–2.9)
LYMPHOCYTES NFR BLD AUTO: 37.2 % (ref 15–51)
MCH RBC QN AUTO: 26.5 PG (ref 29–33)
MCHC RBC AUTO-ENTMCNC: 30.2 G/DL (ref 32–37)
MCV RBC AUTO: 87.7 FL (ref 82–101)
MONOCYTES # BLD: 0.6 10^3/UL (ref 0.3–0.9)
MONOCYTES NFR BLD: 5.4 % (ref 0–11)
NEUTROPHILS # BLD: 6.1 10^3/UL (ref 1.6–7.5)
NEUTROPHILS NFR BLD AUTO: 55.6 % (ref 39–77)
NRBC # BLD MANUAL: 0 10^3/UL (ref 0–0)
NRBC BLD AUTO-RTO: 0 /100WBC (ref 0–0)
PLATELET # BLD: 358 10^3/UL (ref 140–415)
PMV BLD AUTO: 11.7 FL (ref 7.4–10.4)
POTASSIUM SERPL-SCNC: 3.7 MMOL/L (ref 3.5–5.1)
RBC # BLD AUTO: 4.87 10^6/UL (ref 4.2–5.4)
SODIUM SERPL-SCNC: 147 MMOL/L (ref 135–144)
WBC # BLD AUTO: 10.9 10^3/UL (ref 4.8–10.8)

## 2017-10-29 PROCEDURE — 93005 ELECTROCARDIOGRAM TRACING: CPT

## 2017-10-29 PROCEDURE — 74000: CPT

## 2017-10-29 PROCEDURE — 71010: CPT

## 2017-10-29 PROCEDURE — 85025 COMPLETE CBC W/AUTO DIFF WBC: CPT

## 2017-10-29 PROCEDURE — 70450 CT HEAD/BRAIN W/O DYE: CPT

## 2017-10-29 PROCEDURE — 80048 BASIC METABOLIC PNL TOTAL CA: CPT

## 2017-10-29 PROCEDURE — 82962 GLUCOSE BLOOD TEST: CPT

## 2017-10-29 PROCEDURE — 70250 X-RAY EXAM OF SKULL: CPT

## 2017-10-29 PROCEDURE — 36415 COLL VENOUS BLD VENIPUNCTURE: CPT

## 2017-10-29 NOTE — ERD
ER Documentation


Chief Complaint


Chief Complaint


PALMER, DIZZINES  THIS AM, PT HAS VENT SHUNT X2YRS





HPI


This is a 37-year-old female with a past medical history of pseudotumor cerebri 

status post  shunt placement, complicated by malfunction with revision in 2015

, recurrent headaches, on seizure prophylaxis, multiple ER visits in the past 

for headache, now presenting with worsening headache over the last day with 

nausea, nonbilious nonbloody vomiting 1, and reported eye fluttering.  The 

patient does not endorse changes to her vision.  She does not have blurry or 

double vision.  She has not had any increased confusion.  The patient has had 

no focal deficits.  The patient has had no weakness or numbness or tingling to 

the face or extremities.  The patient states that she actually did not want to 

come into the emergency department, but her significant other strongly 

encouraged her to do so.  She has not seen her neurosurgeon in quite some time.

  She reports that her neurosurgeon is in [place].  Patient reports taking her 

medications compliantly.





The patient denies feeling sick recently.  The patient denies fever or chills.  

The patient has had no headache or vision changes.  The patient denies 

lightheadedness or dizziness.  The patient has had no chest pain or shortness 

of breath trouble breathing.  The patient denies abdominal pain or changes to 

bowel movements or urination.





ROS


All systems reviewed and are negative except as per history of present illness.





Medications


Home Meds


Active Scripts


Ondansetron Hcl* (Zofran*) 4 Mg Tablet, 4 MG PO Q6H for NAUSEA AND/OR VOMITING, 

#15 TAB


   Prov:MADELINE WOLFE MD         6/26/17


Oxycodone HCl/Acetaminophen (Percocet 5-325 mg Tablet) 1 Each Tablet, 1 EACH PO 

TID for PAIN, #12 TAB


   Prov:MADELINE WOLFE MD         6/26/17


Oxycodone HCl/Acetaminophen (Percocet 5-325 mg Tablet) 1 Each Tablet, 1 EACH PO 

TID for PAIN, #12 TAB


   Prov:MADELINE WOLFE MD         6/26/17


Lorazepam* (Lorazepam*) 1 Mg Tablet, 1 MG PO Q8H Y for SEIZURE OR ANXIETY, #15 

TAB


   Prov:WINSTON LE DO         5/21/17


Hydrocodone/Acetaminophen (Norco 5-325 Tablet) 1 Each Tablet, 1 TAB PO Q6H Y 

for PAIN, #20 TAB


   Prov:LOLYKARLO         4/16/17


Alprazolam* (Xanax*) 0.5 Mg Tab, 0.5 MG PO TID, #20 TAB


   Prov:STANLEYJESUSALBERTJOSE FRANCISCO JHONATANLynda BELLA         10/23/16


Tramadol Hcl* (Ultram*) 50 Mg Tablet, 50 MG PO Q6H Y for PAIN, #10 TAB


   Prov:FANNY TYLER MD         7/21/16


Reported Medications


Levetiracetam* (Levetiracetam*) 500 Mg Tablet, 500 MG PO BID, TAB


   6/26/17


Topiramate* (Topamax*) 25 Mg Cap.sprink, 25 MG PO BID, CAP


   6/26/17


Levetiracetam* (Keppra*) 1,000 Mg Tablet, 1000 MG PO BID, TAB


   4/16/17


Methocarbamol* (Robaxin*) 500 Mg Tab, 500 MG PO BID, TAB


   7/21/16


Meclizine Hcl* (Meclizine Hcl*) 25 Mg Tablet, 25 MG PO Q8H Y for DIZZINESS, TAB


   7/21/16


Venlafaxine Hcl* (Effexor XR*) 75 Mg Tab.er.24, 75 MG PO QAM, TAB.SA


   7/21/16


Furosemide* (Furosemide*) 20 Mg Tablet, 20 MG PO DAILY, #60 TAB


   7/21/16


Ondansetron Hcl* (Zofran* ODT) 4 mg -ODT Tab.disper, 4 MG PO Q8 Y for NAUSEA 

AND OR VOMITING, TAB


   1/6/15


Topiramate* (Topiramate*) 200 Mg Tablet, 200 MG PO BID, TAB


   1/6/15


Gabapentin* (Neurontin*) 300 Mg Capsule, 300 MG PO HS, CAP


   1/6/15


Mirtazapine* (Mirtazapine*) 15 Mg Tablet, 15 MG PO HS, TAB


   1/6/15


Hydroxyzine Hcl* (Hydroxyzine Hcl*) 25 Mg Tablet, 25 MG PO Q8H Y for ITCHING, 

TAB


   1/6/15


Acetazolamide* (Acetazolamide* ER) 500 Mg Capsule.er, 500 MG PO TID, CAP


   1/6/15





Allergies


Allergies:  


Coded Allergies:  


     No Known Allergies (Verified  Allergy, Unknown, 4/16/17)





PMhx/Soc


History of Surgery:  Yes ( Shunt)


Anesthesia Reaction:  No


Hx Neurological Disorder:  Yes (pseudotumor cerebri)


Hx Respiratory Disorders:  No


Hx Cardiac Disorders:  Yes (HTN)


Hx Psychiatric Problems:  No


Hx Miscellaneous Medical Probl:  No


Hx Alcohol Use:  No


Hx Substance Use:  No


Hx Tobacco Use:  No


Smoking Status:  Never smoker





FmHx


Family History:  coronary disease





Physical Exam


Vitals





Vital Signs








  Date Time  Temp Pulse Resp B/P Pulse Ox O2 Delivery O2 Flow Rate FiO2


 


10/29/17 08:45 97.4 84 20 138/84 99   








Physical Exam


Const:     No apparent distress, well-developed, well-nourished


Head:   Atraumatic 


Eyes:    Normal Conjunctiva.  Extraocular movements intact.  Patient's eyelids 

are fluttering, but she is distractible at which point her eyelids stopped 

fluttering.


ENT:    Normal External Ears, Nose and Mouth.


Neck:                 Full range of motion. ~ No meningismus.


Resp:   Clear to auscultation bilaterally


Cardio:   Regular rate and rhythm, no murmurs


Abd:    BMI 36, soft, non tender, non distended. Normal bowel sounds


Skin:   No petechiae or rashes


Back:   No midline or flank tenderness


Ext:    No cyanosis, or edema


Neur:   Awake and alert, oriented 4.  Cranial nerves intact.  No facial droop.

  Normal strength and sensation in all extremities.  Coordination with finger 

to nose normal.


Psych:    Normal Mood and Affect


Result Diagram:  


10/29/17 1135                                                                  

              10/29/17 1135





Results 24 hrs





 Laboratory Tests








Test


  10/29/17


09:37 10/29/17


11:35


 


Bedside Glucose 74mg/dL  


 


White Blood Count  10.910^3/ul 


 


Red Blood Count  4.8710^6/ul 


 


Hemoglobin  12.9g/dl 


 


Hematocrit  42.7% 


 


Mean Corpuscular Volume  87.7fl 


 


Mean Corpuscular Hemoglobin  26.5pg 


 


Mean Corpuscular Hemoglobin


Concent 


  30.2g/dl 


 


 


Red Cell Distribution Width  15.8% 


 


Platelet Count  17642^3/UL 


 


Mean Platelet Volume  11.7fl 


 


Neutrophils %  55.6% 


 


Lymphocytes %  37.2% 


 


Monocytes %  5.4% 


 


Eosinophils %  1.1% 


 


Basophils %  0.4% 


 


Nucleated Red Blood Cells %  0.0/100WBC 


 


Neutrophils #  6.110^3/ul 


 


Lymphocytes #  4.110^3/ul 


 


Monocytes #  0.610^3/ul 


 


Eosinophils #  0.110^3/ul 


 


Basophils #  0.010^3/ul 


 


Nucleated Red Blood Cells #  0.010^3/ul 


 


Sodium Level  147mmol/L 


 


Potassium Level  3.7mmol/L 


 


Chloride Level  119mmol/L 


 


Carbon Dioxide Level  17mmol/L 


 


Anion Gap  15 


 


Blood Urea Nitrogen  14mg/dl 


 


Creatinine  0.87mg/dl 


 


Glucose Level  83mg/dl 


 


Calcium Level  8.9mg/dl 








 Current Medications








 Medications


  (Trade)  Dose


 Ordered  Sig/Glen


 Route


 PRN Reason  Start Time


 Stop Time Status Last Admin


Dose Admin


 


 Sodium Chloride


  (NS)  500 ml @ 


 500 mls/hr  Q1H STAT


 IV


   10/29/17 09:19


 10/29/17 10:18 DC 10/29/17 09:56


 


 


 Acetaminophen


  (Tylenol Tab)  1,000 mg  ONCE  STAT


 PO


   10/29/17 09:45


 10/29/17 09:48 DC 10/29/17 09:56


 


 


 Prochlorperazine


  (Compazine Inj)  10 mg  ONCE  STAT


 IV


   10/29/17 09:45


 10/29/17 09:48 DC  


 


 


 Diphenhydramine


 HCl


  (Benadryl)  25 mg  ONCE  STAT


 IV


   10/29/17 09:45


 10/29/17 09:48 DC  


 


 


 Ketorolac


 Tromethamine


  (Toradol)  15 mg  ONCE  STAT


 IV


   10/29/17 10:34


 10/29/17 10:35 DC  


 











Procedures/MDM


MDM





Patient's presentation warrants further investigation.  The patient has a  

shunt which needs to be evaluated anytime she presents with a headache with 

nausea and vomiting.  That said, her neurologic exam is reassuring.  I am 

suspicious that her eye fluttering is of an organic pathology as it stops when 

she is distracted.  She reports taking her medications compliantly.  I will 

obtain basic blood work and an EKG to evaluate for any other possible 

etiologies of headache.  I will obtain a urine pregnancy test.  I also evaluate 

the shunt with a CT of the head and x-rays of the skull, chest and abdomen.  He 

will receive medications for a headache, including IV fluids, Compazine and 

Benadryl.  I will consider Toradol if her CT scan of the head is negative for 

intracranial hemorrhage.  I will also consider Depakote if her headache 

persists.





LABS





The patient's blood work was obtained and reviewed.  The patient seemed shows 

mild leukocytosis but no left shift.  The patient is afebrile, and I do not 

suspect a systemic infection.  The patient is not anemic today.  The patient's 

platelet count is unremarkable.  The patient's CMP shows mild hypernatremia 

that has been seen in the past. She also has a mild drop in her CO2, but no 

anion gap. This may be evaluated as an outpatient.  The patient has normal 

renal function testing.  Patient is not pregnant.





EKG





EKG read by me: 


Rate/Rhythm: Regular rate and rhythm at a rate of 74bpm


Intervals: Normal


Axis: Normal


Impression: No evidence of ischemia or arrhythmia





IMAGING





CT Head


FINDINGS: Right trans frontal ventricular shunt is unchanged in position with 

the tip in the region of the body of the left lateral ventricle. The ventricles 

- sulci are unchanged in size and configuration without hydrocephalus 

identified. Lateral - third ventricles remain small, slit-like in appearance. 

No acute intracranial hemorrhage is identified. No extra-axial fluid collection 

is seen. No mass effect or midline shift is identified. The density of the 

brain is unremarkable. Gray-white differentiation is preserved. Old right 

parietal calvarial jabier hole is also noted. The rest of the calvarium, and 

skull base are intact. Mastoid air cells and imaged paranasal sinuses grossly 

clear. 


 


IMPRESSION: No evidence of acute intracranial pathology. No significant 

interval change. Right trans frontal ventricular shunt in place, with small 

slit like third - lateral ventricles.


 


Electronically viewed and signed by .Sergey Abernathy MD, MD on 10/29/2017 10:25





XR Skull


FINDINGS: Intact right frontal ventricular shunt tubing courses through the 

right soft tissues of the neck, overlies the hemithorax and extends caudal to 

the right hemidiaphragm. No aggressive appearing osteolytic or osteoblastic 

lesions involve the calvarium. The soft tissues are unremarkable.


IMPRESSION:  Intact right frontal ventricular shunt tubing.  No aggressive 

appearing osteolytic or osteoblastic lesions which involve the calvarium. 


Electronically viewed and signed by Physician Shantel on 10/29/2017 10

:57 





XR Chest


FINDINGS: The cardiomediastinal silhouette is stable in size and configuration. 

No superimposed acute pulmonary process is present. A right sided  shunt 

catheter overlies the right hemithorax and extends caudal to the diaphragm. The 

soft tissues are unremarkable.


IMPRESSION: No acute pulmonary disease. Right-sided  shunt catheter which 

overlies the right hemithorax and extends caudal to the hemidiaphragm.


Electronically viewed and signed by Dillon Estrada Physician on 10/29/2017 10

:49 





XR Abd


FINDINGS: A right-sided  shunt catheter tube crosses the midline at the 

approximate L4 vertebral body. The tip terminates within the right tatum 

pelvis.. The bowel gas pattern is normal. There is no evidence of obstruction. 

There are no abnormal calcifications overlying the urinary tracts.


The osseus structures are unremarkable.  


IMPRESSION: Intact  catheter with tip terminating in the right tatum pelvis.  

No evidence of an obstructive bowel gas pattern.


Electronically viewed and signed by Physician Shantel on 10/29/2017 10

:51 





TREATMENT/DISPOSITION





The patient was given a headache cocktail of IV fluids, Compazine, Benadryl and 

Toradol with resolution of her headache. Her  shunt studies are reassuring. I 

do not suspect an infection. She has a normal neurologic exam.





Stable for discharge.  She needs to follow-up with her primary care doctor in 2-

3 days for reevaluation.  She also needs to call her neurosurgeon to schedule a 

follow-up appointment, as the patient reports that it has been a long time 

since she has seen her neurosurgeon.  The patient be given precautions with 

which to return to the emergency department.





Patient expressed understanding of this and intends to follow-up.  The patient 

left prior to discharge paperwork being given to her verbal discharge 

instructions were provided.  The patient pulled her own IV out.





Departure


Diagnosis:  


 Primary Impression:  


 Headache


 Headache type:  unspecified  Headache chronicity pattern:  chronic headache  

Intractability:  not intractable  Qualified Code:  R51 - Chronic nonintractable 

headache, unspecified headache type


 Additional Impressions:  


 Metabolic acidosis


  (ventriculoperitoneal) shunt status


Condition:  Stable











MALAIKA TAYLOR MD Oct 29, 2017 10:20

## 2017-10-29 NOTE — RADRPT
PROCEDURE: Skull series.

 

CLINICAL INDICATION:  shunt series.

 

TECHNIQUE:   AP and cross-table lateral views of the skull were obtained.

 

COMPARISON: Chest and abdomen of the same date.

 

FINDINGS: Intact right frontal ventricular shunt tubing courses through the right soft tissues of th
e neck, overlies the hemithorax and extends caudal to the right hemidiaphragm. No aggressive appeari
ng osteolytic or osteoblastic lesions involve the calvarium. The soft tissues are unremarkable.

 

 

IMPRESSION:

 

1.  Intact right frontal ventricular shunt tubing.

2.  No aggressive appearing osteolytic or osteoblastic lesions which involve the calvarium.

 

RPTAT: HRSR

_____________________________________________ 

Physician Shantel           Date    Time 

Electronically viewed and signed by Dillon Estrada Physician on 10/29/2017 10:57 

 

D:  10/29/2017 10:57  T:  10/29/2017 10:57

RR/

## 2017-10-29 NOTE — RADRPT
PROCEDURE:   XR Abdomen. 

 

CLINICAL INDICATION:    shunt series

 

TECHNIQUE:   AP supine.

 

COMPARISON: Chest of same date.

 

FINDINGS:

A right-sided  shunt catheter tube crosses the midline at the approximate L4 vertebral body. The t
ip terminates within the right tatum pelvis.. The bowel gas pattern is normal. There is no evidence o
f obstruction. There are no abnormal calcifications overlying the urinary tracts.

The osseus structures are unremarkable.  

 

IMPRESSION:

1. Intact  catheter with tip terminating in the right tatum pelvis.

2.  No evidence of an obstructive bowel gas pattern.

 

RPTAT: HRSR

_____________________________________________ 

Physician Shantel           Date    Time 

Electronically viewed and signed by Physician Shantel on 10/29/2017 10:51 

 

D:  10/29/2017 10:51  T:  10/29/2017 10:51

RR/

## 2017-10-29 NOTE — RADRPT
PROCEDURE:  CT brain without contrast

 

CLINICAL INDICATION:  Headache 

 

TECHNIQUE:  CT of the brain without contrast was performed on a multidetector CT scanner, with multi
planar reformats.  One or more of the following dose reduction techniques were used: Automated expos
ure control, adjustment in mA and / or kV according to patient size, use of iterative reconstructive
 technique. CTDIvol = 45 mGy; DLP = 720 mGy-cm. 

 

COMPARISON:   CT brain 10/02/2017 

 

FINDINGS:

Right trans frontal ventricular shunt is unchanged in position with the tip in the region of the bod
y of the left lateral ventricle. The ventricles - sulci are unchanged in size and configuration with
out hydrocephalus identified. Lateral - third ventricles remain small, slit-like in appearance.

 

No acute intracranial hemorrhage is identified. No extra-axial fluid collection is seen. No mass eff
ect or midline shift is identified.

 

The density of the brain is unremarkable. Gray-white differentiation is preserved. 

 

Old right parietal calvarial jabier hole is also noted. The rest of the calvarium, and skull base are 
intact. Mastoid air cells and imaged paranasal sinuses grossly clear. 

 

 

IMPRESSION:

1. No evidence of acute intracranial pathology. No significant interval change.

2. Right trans frontal ventricular shunt in place, with small slit like third - lateral ventricles.

 

RPTAT: AA

_____________________________________________ 

.Sergey Abernathy MD, MD           Date    Time 

Electronically viewed and signed by .Sergey Abernathy MD, MD on 10/29/2017 10:25 

 

D:  10/29/2017 10:25  T:  10/29/2017 10:25

.O/

## 2017-10-29 NOTE — RADRPT
PROCEDURE:   XR Chest. 

 

CLINICAL INDICATION:   shunt series

 

TECHNIQUE:   Single portable view  of the chest was obtained. 

 

COMPARISON:   CT BRAIN 10/29/2017; CR CHEST 10/23/2016 

 

FINDINGS:

The cardiomediastinal silhouette is stable in size and configuration. No superimposed acute pulmonar
y process is present. A right sided  shunt catheter overlies the right hemithorax and extends caud
al to the diaphragm. The soft tissues are unremarkable.

 

 

IMPRESSION:

 

1.  No acute pulmonary disease.

2.  Right-sided  shunt catheter which overlies the right hemithorax and extends caudal to the tatum
diaphragm.

 

RPTAT: HRSR

_____________________________________________ 

Physician Shantel           Date    Time 

Electronically viewed and signed by Physician Shantel on 10/29/2017 10:49 

 

D:  10/29/2017 10:49  T:  10/29/2017 10:49

RR/

## 2018-10-01 ENCOUNTER — HOSPITAL ENCOUNTER (EMERGENCY)
Dept: HOSPITAL 91 - E/R | Age: 38
Discharge: HOME | End: 2018-10-01
Payer: COMMERCIAL

## 2018-10-01 DIAGNOSIS — R40.2362: ICD-10-CM

## 2018-10-01 DIAGNOSIS — R19.7: ICD-10-CM

## 2018-10-01 DIAGNOSIS — R40.2252: ICD-10-CM

## 2018-10-01 DIAGNOSIS — E87.6: Primary | ICD-10-CM

## 2018-10-01 DIAGNOSIS — R40.2142: ICD-10-CM

## 2018-10-01 LAB
ADD MAN DIFF?: NO
ADD UMIC: YES
ALANINE AMINOTRANSFERASE: 17 IU/L (ref 13–69)
ALBUMIN/GLOBULIN RATIO: 0.97
ALBUMIN: 3.8 G/DL (ref 3.3–4.9)
ALKALINE PHOSPHATASE: 115 IU/L (ref 42–121)
ANION GAP: 11 (ref 8–16)
ASPARTATE AMINO TRANSFERASE: 23 IU/L (ref 15–46)
BASOPHIL #: 0 10^3/UL (ref 0–0.1)
BASOPHILS %: 0.2 % (ref 0–2)
BILIRUBIN,DIRECT: 0 MG/DL (ref 0–0.2)
BILIRUBIN,TOTAL: 0.1 MG/DL (ref 0.2–1.3)
BLOOD UREA NITROGEN: 8 MG/DL (ref 7–20)
CALCIUM: 8.7 MG/DL (ref 8.4–10.2)
CARBON DIOXIDE: 17 MMOL/L (ref 21–31)
CHLORIDE: 116 MMOL/L (ref 97–110)
CREATININE: 0.86 MG/DL (ref 0.44–1)
EOSINOPHILS #: 0.1 10^3/UL (ref 0–0.5)
EOSINOPHILS %: 0.7 % (ref 0–7)
GLOBULIN: 3.9 G/DL (ref 1.3–3.2)
GLUCOSE: 111 MG/DL (ref 70–220)
HEMATOCRIT: 40.2 % (ref 37–47)
HEMOGLOBIN: 12.8 G/DL (ref 12–16)
IMMATURE GRANS #M: 0.04 10^3/UL (ref 0–0.03)
IMMATURE GRANS % (M): 0.3 % (ref 0–0.43)
LIPASE: 87 U/L (ref 23–300)
LYMPHOCYTES #: 3.3 10^3/UL (ref 0.8–2.9)
LYMPHOCYTES %: 27.5 % (ref 15–51)
MEAN CORPUSCULAR HEMOGLOBIN: 26.9 PG (ref 29–33)
MEAN CORPUSCULAR HGB CONC: 31.8 G/DL (ref 32–37)
MEAN CORPUSCULAR VOLUME: 84.6 FL (ref 82–101)
MEAN PLATELET VOLUME: 11.8 FL (ref 7.4–10.4)
MONOCYTE #: 0.7 10^3/UL (ref 0.3–0.9)
MONOCYTES %: 5.5 % (ref 0–11)
NEUTROPHIL #: 8 10^3/UL (ref 1.6–7.5)
NEUTROPHILS %: 65.8 % (ref 39–77)
NUCLEATED RED BLOOD CELLS #: 0 10^3/UL (ref 0–0)
NUCLEATED RED BLOOD CELLS%: 0 /100WBC (ref 0–0)
PLATELET COUNT: 314 10^3/UL (ref 140–415)
POTASSIUM: 3.1 MMOL/L (ref 3.5–5.1)
RED BLOOD COUNT: 4.75 10^6/UL (ref 4.2–5.4)
RED CELL DISTRIBUTION WIDTH: 16.9 % (ref 11.5–14.5)
SODIUM: 141 MMOL/L (ref 135–144)
TOTAL PROTEIN: 7.7 G/DL (ref 6.1–8.1)
UR ASCORBIC ACID: 40 MG/DL
UR BACTERIA: (no result) /HPF
UR BILIRUBIN (DIP): (no result) MG/DL
UR BLOOD (DIP): NEGATIVE MG/DL
UR CLARITY: (no result)
UR COLOR: YELLOW
UR GLUCOSE (DIP): NEGATIVE MG/DL
UR KETONES (DIP): NEGATIVE MG/DL
UR LEUKOCYTE ESTERASE (DIP): (no result) LEU/UL
UR MUCUS: (no result) /HPF
UR NITRITE (DIP): NEGATIVE MG/DL
UR PH (DIP): 5 (ref 5–9)
UR RBC: 1 /HPF (ref 0–5)
UR SPECIFIC GRAVITY (DIP): 1.03 (ref 1–1.03)
UR SQUAMOUS EPITHELIAL CELL: (no result) /HPF
UR TOTAL PROTEIN (DIP): NEGATIVE MG/DL
UR UROBILINOGEN (DIP): NEGATIVE MG/DL
UR WBC: 4 /HPF (ref 0–5)
WHITE BLOOD COUNT: 12.1 10^3/UL (ref 4.8–10.8)

## 2018-10-01 PROCEDURE — 70450 CT HEAD/BRAIN W/O DYE: CPT

## 2018-10-01 PROCEDURE — 81025 URINE PREGNANCY TEST: CPT

## 2018-10-01 PROCEDURE — 99285 EMERGENCY DEPT VISIT HI MDM: CPT

## 2018-10-01 PROCEDURE — 80053 COMPREHEN METABOLIC PANEL: CPT

## 2018-10-01 PROCEDURE — 83690 ASSAY OF LIPASE: CPT

## 2018-10-01 PROCEDURE — 85025 COMPLETE CBC W/AUTO DIFF WBC: CPT

## 2018-10-01 PROCEDURE — 36415 COLL VENOUS BLD VENIPUNCTURE: CPT

## 2018-10-01 PROCEDURE — 81001 URINALYSIS AUTO W/SCOPE: CPT

## 2018-10-01 PROCEDURE — 96374 THER/PROPH/DIAG INJ IV PUSH: CPT

## 2018-10-01 PROCEDURE — 96375 TX/PRO/DX INJ NEW DRUG ADDON: CPT

## 2018-10-01 RX ADMIN — ONDANSETRON HYDROCHLORIDE 1 MG: 2 INJECTION, SOLUTION INTRAMUSCULAR; INTRAVENOUS at 08:51

## 2018-10-01 RX ADMIN — POTASSIUM CHLORIDE 1 MEQ: 1500 TABLET, EXTENDED RELEASE ORAL at 11:04

## 2018-10-20 ENCOUNTER — HOSPITAL ENCOUNTER (EMERGENCY)
Dept: HOSPITAL 91 - E/R | Age: 38
Discharge: HOME | End: 2018-10-20
Payer: COMMERCIAL

## 2018-10-20 DIAGNOSIS — R42: ICD-10-CM

## 2018-10-20 DIAGNOSIS — G44.209: Primary | ICD-10-CM

## 2018-10-20 DIAGNOSIS — E66.01: ICD-10-CM

## 2018-10-20 LAB
URINE LEUKOCYTE EST (DIP) POC: (no result)
URINE PH (DIP) POC: 7 (ref 5–8.5)

## 2018-10-20 PROCEDURE — 99284 EMERGENCY DEPT VISIT MOD MDM: CPT

## 2018-10-20 PROCEDURE — 81003 URINALYSIS AUTO W/O SCOPE: CPT

## 2018-10-20 PROCEDURE — 96372 THER/PROPH/DIAG INJ SC/IM: CPT

## 2018-10-20 RX ADMIN — ONDANSETRON HYDROCHLORIDE 1 MG: 2 INJECTION, SOLUTION INTRAMUSCULAR; INTRAVENOUS at 20:48

## 2018-10-20 RX ADMIN — LORAZEPAM 1 MG: 1 TABLET ORAL at 20:48

## 2018-10-20 RX ADMIN — KETOROLAC TROMETHAMINE 1 MG: 30 INJECTION, SOLUTION INTRAMUSCULAR at 20:52

## 2018-11-10 ENCOUNTER — HOSPITAL ENCOUNTER (EMERGENCY)
Dept: HOSPITAL 91 - E/R | Age: 38
Discharge: HOME | End: 2018-11-10
Payer: COMMERCIAL

## 2018-11-10 DIAGNOSIS — R40.2362: ICD-10-CM

## 2018-11-10 DIAGNOSIS — G43.909: ICD-10-CM

## 2018-11-10 DIAGNOSIS — R40.2252: ICD-10-CM

## 2018-11-10 DIAGNOSIS — R40.2142: ICD-10-CM

## 2018-11-10 DIAGNOSIS — Z85.3: ICD-10-CM

## 2018-11-10 DIAGNOSIS — G40.909: Primary | ICD-10-CM

## 2018-11-10 DIAGNOSIS — H92.03: ICD-10-CM

## 2018-11-10 LAB
ADD MAN DIFF?: YES
ANION GAP: 7 (ref 5–13)
BASOPHIL #M: 0.3 10^3/UL (ref 0–0)
BASOPHILS % (M): 3 % (ref 0–2)
BLOOD UREA NITROGEN: 10 MG/DL (ref 7–20)
CALCIUM: 8.8 MG/DL (ref 8.4–10.2)
CARBON DIOXIDE: 19 MMOL/L (ref 21–31)
CHLORIDE: 113 MMOL/L (ref 97–110)
CREATININE: 0.71 MG/DL (ref 0.44–1)
EOSINOPHILS % (M): 3 % (ref 0–7)
GLUCOSE: 95 MG/DL (ref 70–220)
HEMATOCRIT: 41 % (ref 37–47)
HEMOGLOBIN: 12.7 G/DL (ref 12–16)
IMMATURE GRANS #M: 0.07 10^3/UL (ref 0–0.03)
IMMATURE GRANS % (M): 0.5 % (ref 0–0.43)
LYMPHOCYTES #M: 4.6 10^3/UL (ref 0.8–2.9)
LYMPHOCYTES % (M): 36 % (ref 15–51)
MEAN CORPUSCULAR HEMOGLOBIN: 26.8 PG (ref 29–33)
MEAN CORPUSCULAR HGB CONC: 31 G/DL (ref 32–37)
MEAN CORPUSCULAR VOLUME: 86.7 FL (ref 82–101)
MEAN PLATELET VOLUME: 12 FL (ref 7.4–10.4)
MONOCYTE #M: 0.3 10^3/UL (ref 0.3–0.9)
MONOCYTES % (M): 3 % (ref 0–11)
NUCLEATED RED BLOOD CELLS%: 0 /100WBC (ref 0–0)
PLATELET COUNT: 352 10^3/UL (ref 140–415)
PLATELET ESTIMATE: NORMAL
POSITIVE DIFF: (no result)
POTASSIUM: 3.3 MMOL/L (ref 3.5–5.1)
REACTIVE LYMPHOCYTES #M: 0.1 10^3/UL (ref 0–0)
REACTIVE LYMPHOCYTES% (M): 1 % (ref 0–0)
RED BLOOD COUNT: 4.73 10^6/UL (ref 4.2–5.4)
RED CELL DISTRIBUTION WIDTH: 16 % (ref 11.5–14.5)
SEGMENTED NEUTROPHILS (M) %: 54 % (ref 39–77)
SMUDGE%M: 4 % (ref 0–0)
SODIUM: 139 MMOL/L (ref 135–144)
WHITE BLOOD COUNT: 12.9 10^3/UL (ref 4.8–10.8)

## 2018-11-10 PROCEDURE — 82962 GLUCOSE BLOOD TEST: CPT

## 2018-11-10 PROCEDURE — 96374 THER/PROPH/DIAG INJ IV PUSH: CPT

## 2018-11-10 PROCEDURE — 74018 RADEX ABDOMEN 1 VIEW: CPT

## 2018-11-10 PROCEDURE — 96375 TX/PRO/DX INJ NEW DRUG ADDON: CPT

## 2018-11-10 PROCEDURE — 85025 COMPLETE CBC W/AUTO DIFF WBC: CPT

## 2018-11-10 PROCEDURE — 96361 HYDRATE IV INFUSION ADD-ON: CPT

## 2018-11-10 PROCEDURE — 99285 EMERGENCY DEPT VISIT HI MDM: CPT

## 2018-11-10 PROCEDURE — 70450 CT HEAD/BRAIN W/O DYE: CPT

## 2018-11-10 PROCEDURE — 71045 X-RAY EXAM CHEST 1 VIEW: CPT

## 2018-11-10 PROCEDURE — 36415 COLL VENOUS BLD VENIPUNCTURE: CPT

## 2018-11-10 PROCEDURE — 80048 BASIC METABOLIC PNL TOTAL CA: CPT

## 2018-11-10 RX ADMIN — DIPHENHYDRAMINE HYDROCHLORIDE 1 MG: 50 INJECTION, SOLUTION INTRAMUSCULAR; INTRAVENOUS at 20:50

## 2018-11-10 RX ADMIN — LORAZEPAM 1 MG: 2 INJECTION, SOLUTION INTRAMUSCULAR; INTRAVENOUS at 20:50

## 2018-11-10 RX ADMIN — THIAMINE HYDROCHLORIDE 1 MLS/HR: 100 INJECTION, SOLUTION INTRAMUSCULAR; INTRAVENOUS at 20:50

## 2018-11-10 RX ADMIN — METOCLOPRAMIDE HYDROCHLORIDE 1 MG: 10 INJECTION, SOLUTION INTRAMUSCULAR; INTRAVENOUS at 20:50
